# Patient Record
Sex: MALE | URBAN - METROPOLITAN AREA
[De-identification: names, ages, dates, MRNs, and addresses within clinical notes are randomized per-mention and may not be internally consistent; named-entity substitution may affect disease eponyms.]

---

## 2019-05-15 ENCOUNTER — OFFICE VISIT (OUTPATIENT)
Dept: URBAN - METROPOLITAN AREA CLINIC 23 | Facility: CLINIC | Age: 54
End: 2019-05-15
Payer: COMMERCIAL

## 2019-05-15 DIAGNOSIS — H52.4 PRESBYOPIA: Primary | ICD-10-CM

## 2019-05-15 PROCEDURE — 92015 DETERMINE REFRACTIVE STATE: CPT | Performed by: OPTOMETRIST

## 2019-05-15 PROCEDURE — 92012 INTRM OPH EXAM EST PATIENT: CPT | Performed by: OPTOMETRIST

## 2019-05-15 ASSESSMENT — INTRAOCULAR PRESSURE
OS: 18
OD: 18

## 2019-05-15 ASSESSMENT — KERATOMETRY
OS: 43.50
OD: 43.00

## 2019-05-15 ASSESSMENT — VISUAL ACUITY
OD: 20/20
OS: 20/20

## 2019-05-15 NOTE — IMPRESSION/PLAN
Impression: Presbyopia: H52.4. OU. Plan: New glasses Rx was given today. Discussed diagnosis in detail with patient.